# Patient Record
(demographics unavailable — no encounter records)

---

## 2025-07-07 NOTE — ASSESSMENT
[FreeTextEntry1] : 50F with first attack of sigmoid diverticulitis with 2cm fluid collection  - referral GI for colonoscopy in 6-8 weeks - Repeat CT scan in 4 weeks to assess fluid collection - discussed risk of recurrence at 11-33% for diverticulitis, will discuss elective colectomy next visit  - Patient o finish PO abx, return to ER precautions given and explained to patient

## 2025-07-07 NOTE — HISTORY OF PRESENT ILLNESS
[de-identified] : 50F presenting as a consultation for diverticulitis. Patient recently was in ER with abdominal pain, found to have acute uncomplicated sigmoid diverticulitis with a stephenie-colonic fluid collection 2cm, unclear if this represents a true abscess. She had minimal pain and was discharged from ER same day. She is on PO abx for 10 days, she reports minimal and improving pain, no fevers or chills, having regular movements, no prior colonoscopy,  no prior attacks

## 2025-07-07 NOTE — DATA REVIEWED
[FreeTextEntry1] : ACC: [id number] EXAM: CT ABDOMEN AND PELVIS IC ORDERED BY: CUBA GLYNN  PROCEDURE DATE: 06/29/2025    INTERPRETATION: CLINICAL INFORMATION: Left abdominal pain  COMPARISON: None.  CONTRAST/COMPLICATIONS: IV Contrast: Omnipaque 350 90 cc administered 10 cc discarded Oral Contrast: NONE.  PROCEDURE: CT of the Abdomen and Pelvis was performed. Sagittal and coronal reformats were performed.  FINDINGS: LOWER CHEST: Lower lobe atelectasis.  LIVER: Within normal limits. BILE DUCTS: Normal caliber. GALLBLADDER: Within normal limits. SPLEEN: Within normal limits. PANCREAS: Within normal limits. ADRENALS: Within normal limits. KIDNEYS/URETERS: 2.1 cm right renal cyst. No hydronephrosis.  BLADDER: Within normal limits. REPRODUCTIVE ORGANS: Uterus and adnexa within normal limits.  BOWEL: No bowel obstruction. Colonic diverticulosis. Appendix is within normal limits. Distal descending colon wall thickening and pericolonic inflammation. Pericolonic fluid collection measuring 1.9 x 0.8 cm. PERITONEUM/RETROPERITONEUM: Trace left abdominal fluid. VESSELS: Within normal limits. LYMPH NODES: No lymphadenopathy. ABDOMINAL WALL: Tiny fat-containing umbilical hernia. BONES: Within normal limits.  IMPRESSION:  Acute diverticulitis involving distal descending colon with small pericolonic fluid collection.   --- End of Report ---      HANNAH ARMANDO MD; Attending Radiologist This document has been electronically signed. Jun 29 2025 12:27PM

## 2025-07-07 NOTE — PHYSICAL EXAM
[Normal Breath Sounds] : Normal breath sounds [Normal Heart Sounds] : normal heart sounds [No Rash or Lesion] : No rash or lesion [Purpura] : no purpura  [Petechiae] : no petechiae [Skin Ulcer] : no ulcer [Skin Induration] : no induration [Alert] : alert [Oriented to Person] : oriented to person [Oriented to Place] : oriented to place [Oriented to Time] : oriented to time [Calm] : calm [de-identified] : In no acute distress [de-identified] : NCAT [de-identified] : supple  [de-identified] : soft, mild LLQ tenderness, no peritoneal signs, no distension  [de-identified] : 5/5 motor and sensory b/l